# Patient Record
Sex: MALE | Race: BLACK OR AFRICAN AMERICAN | NOT HISPANIC OR LATINO | Employment: FULL TIME | URBAN - METROPOLITAN AREA
[De-identification: names, ages, dates, MRNs, and addresses within clinical notes are randomized per-mention and may not be internally consistent; named-entity substitution may affect disease eponyms.]

---

## 2017-10-16 ENCOUNTER — OFFICE VISIT (OUTPATIENT)
Dept: OPHTHALMOLOGY | Facility: CLINIC | Age: 22
End: 2017-10-16
Payer: COMMERCIAL

## 2017-10-16 DIAGNOSIS — T15.01XA CORNEAL FOREIGN BODY WITH RESIDUAL MATERIAL, RIGHT, INITIAL ENCOUNTER: Primary | ICD-10-CM

## 2017-10-16 PROCEDURE — 92002 INTRM OPH EXAM NEW PATIENT: CPT | Mod: 25,S$GLB,, | Performed by: OPTOMETRIST

## 2017-10-16 PROCEDURE — 99999 PR PBB SHADOW E&M-EST. PATIENT-LVL II: CPT | Mod: PBBFAC,,, | Performed by: OPTOMETRIST

## 2017-10-16 PROCEDURE — 65222 REMOVE FOREIGN BODY FROM EYE: CPT | Mod: RT,S$GLB,, | Performed by: OPTOMETRIST

## 2017-10-16 NOTE — PROGRESS NOTES
HPI     New Patient  Chief complaint: Irritation, OD  Onset: 3 days ago (Friday)  Patient was seen at Urgent Care (saturday)  Medication: Ocuflox 0.3%  Right eye is red  Watering throughout the day  Matting in the AM  Blurred vision  Light sensitive   Patient does not use any vision correction    Last edited by Dion Hill MA on 10/16/2017  2:09 PM. (History)            Assessment /Plan     For exam results, see Encounter Report.    Corneal foreign body with residual material, right, initial encounter      OD:   Instilled 2 drops tetracaine and 5% HA. Corneal metal foreign body removed with sterile 23g needle without complaint.  No rust ring present.  I will follow-up tomorrow.

## 2017-10-17 ENCOUNTER — OFFICE VISIT (OUTPATIENT)
Dept: OPHTHALMOLOGY | Facility: CLINIC | Age: 22
End: 2017-10-17
Payer: COMMERCIAL

## 2017-10-17 DIAGNOSIS — T15.01XD CORNEAL FOREIGN BODY WITH RESIDUAL MATERIAL, RIGHT, SUBSEQUENT ENCOUNTER: Primary | ICD-10-CM

## 2017-10-17 PROCEDURE — 92012 INTRM OPH EXAM EST PATIENT: CPT | Mod: S$GLB,,, | Performed by: OPTOMETRIST

## 2017-10-17 PROCEDURE — 99999 PR PBB SHADOW E&M-EST. PATIENT-LVL II: CPT | Mod: PBBFAC,,, | Performed by: OPTOMETRIST

## 2017-10-17 NOTE — PROGRESS NOTES
HPI     [place] [facility] [place]16/2017  1 day follow up   Foreign body, OD  Right eye is red  Feeling better  Light sensitive   Blurred vision  Medication: Ocuflox 0.3%      Last edited by Dion Hill MA on 10/17/2017 10:53 AM. (History)            Assessment /Plan     For exam results, see Encounter Report.    Corneal foreign body with residual material, right, initial encounter      Healing from FB removal OD.  Continue with OCUFLOX QID for 5 days.  RTC prn or urgently if S/S worsen or do not resolve in 3-4 days.

## 2019-08-26 ENCOUNTER — OFFICE VISIT (OUTPATIENT)
Dept: OPHTHALMOLOGY | Facility: CLINIC | Age: 24
End: 2019-08-26
Payer: COMMERCIAL

## 2019-08-26 DIAGNOSIS — H16.142 SUPERFICIAL PUNCTATE KERATITIS OF LEFT EYE: Primary | ICD-10-CM

## 2019-08-26 PROCEDURE — 99999 PR PBB SHADOW E&M-EST. PATIENT-LVL II: CPT | Mod: PBBFAC,,, | Performed by: OPTOMETRIST

## 2019-08-26 PROCEDURE — 99999 PR PBB SHADOW E&M-EST. PATIENT-LVL II: ICD-10-PCS | Mod: PBBFAC,,, | Performed by: OPTOMETRIST

## 2019-08-26 PROCEDURE — 92014 COMPRE OPH EXAM EST PT 1/>: CPT | Mod: S$GLB,,, | Performed by: OPTOMETRIST

## 2019-08-26 PROCEDURE — 92014 PR EYE EXAM, EST PATIENT,COMPREHESV: ICD-10-PCS | Mod: S$GLB,,, | Performed by: OPTOMETRIST

## 2019-08-26 RX ORDER — NEOMYCIN SULFATE, POLYMYXIN B SULFATE AND DEXAMETHASONE 3.5; 10000; 1 MG/ML; [USP'U]/ML; MG/ML
1 SUSPENSION/ DROPS OPHTHALMIC 4 TIMES DAILY
Qty: 5 ML | Refills: 0 | Status: SHIPPED | OUTPATIENT
Start: 2019-08-26 | End: 2019-09-02

## 2024-09-08 NOTE — PROGRESS NOTES
HPI     Last [facility] [place] 10/17/2017  Chief complaint: foreign body sensation   Onset: about 5 days ago   Patient was doing some drilling and some metal debris may have gotten into   the left eye   Left eye is red   Watering   No light sensitivity   Matting in the AM   No blurred vision   Tried to flush eye with water   Using Visine     Last edited by Dion Hill MA on 8/26/2019  9:22 AM. (History)            Assessment /Plan     For exam results, see Encounter Report.    Superficial punctate keratitis of left eye  -     neomycin-polymyxin-dexamethasone (MAXITROL) 3.5mg/mL-10,000 unit/mL-0.1 % DrpS; Place 1 drop into the left eye 4 (four) times daily. for 7 days  Dispense: 5 mL; Refill: 0      Mild PEK along lower lid margin consistent with exposure keratitis.  No FB seen.  Everted upper lid = no FB.  RTC prn.                  Yes